# Patient Record
Sex: FEMALE | Employment: OTHER | URBAN - METROPOLITAN AREA
[De-identification: names, ages, dates, MRNs, and addresses within clinical notes are randomized per-mention and may not be internally consistent; named-entity substitution may affect disease eponyms.]

---

## 2023-04-09 ENCOUNTER — HOSPITAL ENCOUNTER (EMERGENCY)
Facility: CLINIC | Age: 88
Discharge: HOME | End: 2023-04-09

## 2023-04-09 VITALS
TEMPERATURE: 98.1 F | OXYGEN SATURATION: 99 % | HEART RATE: 66 BPM | DIASTOLIC BLOOD PRESSURE: 73 MMHG | RESPIRATION RATE: 18 BRPM | SYSTOLIC BLOOD PRESSURE: 189 MMHG

## 2023-04-09 NOTE — ED TRIAGE NOTES
Patient reports she was stepping up a step and lost her balance causing her to fall backwards and strike her head on the concrete. Patient denies pain and LOC. Patient is on a blood thinner.      Triage Assessment     Row Name 04/09/23 1518       Triage Assessment (Adult)    Airway WDL WDL       Respiratory WDL    Respiratory WDL WDL       Skin Circulation/Temperature WDL    Skin Circulation/Temperature WDL WDL       Peripheral/Neurovascular WDL    Peripheral Neurovascular WDL WDL       Cognitive/Neuro/Behavioral WDL    Cognitive/Neuro/Behavioral WDL WDL

## 2023-04-09 NOTE — ED NOTES
Patient triaged, and informed of wait time, CT ordered for patient. Patient and family were unwilling to wait. Patient's daughter upset that patient would not receive preferential treatment due to her condition, writer explained that patient was not altered or confused and her vitals were stable. Daughter decided they were leaving.